# Patient Record
Sex: MALE | Race: WHITE | HISPANIC OR LATINO | URBAN - METROPOLITAN AREA
[De-identification: names, ages, dates, MRNs, and addresses within clinical notes are randomized per-mention and may not be internally consistent; named-entity substitution may affect disease eponyms.]

---

## 2018-10-08 ENCOUNTER — EMERGENCY (EMERGENCY)
Facility: HOSPITAL | Age: 33
LOS: 1 days | Discharge: HOME | End: 2018-10-08
Attending: EMERGENCY MEDICINE | Admitting: EMERGENCY MEDICINE
Payer: MEDICAID

## 2018-10-08 VITALS
HEART RATE: 71 BPM | DIASTOLIC BLOOD PRESSURE: 78 MMHG | SYSTOLIC BLOOD PRESSURE: 125 MMHG | RESPIRATION RATE: 18 BRPM | OXYGEN SATURATION: 100 % | TEMPERATURE: 98 F

## 2018-10-08 DIAGNOSIS — Z59.0 HOMELESSNESS: ICD-10-CM

## 2018-10-08 DIAGNOSIS — M79.669 PAIN IN UNSPECIFIED LOWER LEG: ICD-10-CM

## 2018-10-08 PROCEDURE — 99053 MED SERV 10PM-8AM 24 HR FAC: CPT

## 2018-10-08 PROCEDURE — 99282 EMERGENCY DEPT VISIT SF MDM: CPT | Mod: 25

## 2018-10-08 SDOH — ECONOMIC STABILITY - HOUSING INSECURITY: HOMELESSNESS: Z59.0

## 2018-10-08 NOTE — ED ADULT NURSE NOTE - OBJECTIVE STATEMENT
Pt is a 33y male complaining of bilateral leg pain. Pt is a 33y male complaining of bilateral leg pain. Pt denies injury. Pt ambulates with steady gait. Pt denies chest pain, sob, nausea, vomiting, fever, chills.

## 2018-10-08 NOTE — ED PROVIDER NOTE - MEDICAL DECISION MAKING DETAILS
pt reports he just needs place to rest, no medical complaints, homeless    will allow to rest until am, no acute medical issues

## 2018-10-08 NOTE — ED PROVIDER NOTE - PROGRESS NOTE DETAILS
Patient AAOx3, clear speech, steady gait, appropriate for discharge, patient feels improved, wants to go home, tolerating PO, will discharge with strict return precautions, follow up with PMD

## 2018-10-08 NOTE — ED ADULT NURSE NOTE - MUSCULOSKELETAL ASSESSMENT
Chief Complaint   Patient presents with   • Results     Labs   • Medication Refill     Omeprazole-Insurance won't cover       HISTORY OF PRESENT ILLNESS: Patient is a 35 y.o. male established patient who presents today to discuss number of issues    Dyspepsia  Patient states history of irregular bowel habits and history of onset stomach. Positive history of nausea. Patient states that ranitidine 150 mg prescription strength worked better than the over-the-counter remedy patient states that also the omeprazole did help, but states only 5 days at a time was being prescribed for patient and filled through his insurance. Patient she would like to try a different medication.    Irregular bowel habits  Chronic in nature. Patient states still symptomatic. Patient states he has not followed up with GI from previous encounter.    Patient Active Problem List    Diagnosis Date Noted   • Tourette disorder 07/21/2016   • Irregular bowel habits 07/21/2016   • Dyspepsia 07/21/2016     Allergies:Review of patient's allergies indicates no known allergies.    Current Outpatient Prescriptions   Medication Sig Dispense Refill   • rabeprazole (ACIPHEX) 20 MG tablet Take 1 Tab by mouth every day. 30 Tab 3   • potassium chloride ER (KLOR-CON) 10 MEQ tablet Take 1 Tab by mouth every day for 5 days. 5 Tab 0   • levothyroxine (SYNTHROID) 25 MCG Tab Take 1 Tab by mouth Every morning on an empty stomach. 30 Tab 2   • witch hazel-glycerin (SOOZE) Pads i pad applied bid 60 Each 0   • ondansetron (ZOFRAN) 4 MG Tab tablet Take 1 Tab by mouth every four hours as needed for Nausea/Vomiting. 30 Tab 1   • clonidine (CATAPRESS) 0.3 MG Tab Take 1 Tab by mouth 3 times a day and at bedtime. 90 Tab 3     No current facility-administered medications for this visit.     Social History   Substance Use Topics   • Smoking status: Never Smoker    • Smokeless tobacco: Never Used   • Alcohol Use: No     No family status information on file.   No family history on  "file.    Review of Systems:   Constitutional: Negative for fever, chills, weight loss and malaise/fatigue.   HENT: Negative for ear pain, nosebleeds, congestion, sore throat and neck pain.    Eyes: Negative for blurred vision.   Respiratory: Negative for cough, sputum production, shortness of breath and wheezing.    Cardiovascular: Negative for chest pain, palpitations, orthopnea and leg swelling.   Gastrointestinal: Negative for heartburn, nausea, vomiting and abdominal pain.   Genitourinary: Negative for dysuria, urgency and frequency.   Musculoskeletal: Negative for myalgias, back pain and joint pain.   Skin: Negative for rash and itching.   Neurological: Negative for dizziness, tingling, tremors, sensory change, focal weakness and headaches.   Endo/Heme/Allergies: Does not bruise/bleed easily.   Psychiatric/Behavioral: Negative for depression, suicidal ideas and memory loss.  The patient is not nervous/anxious and does not have insomnia.    All other systems reviewed and are negative except as in HPI.    Exam:  Blood pressure 118/70, pulse 74, temperature 36.7 °C (98.1 °F), resp. rate 16, height 1.778 m (5' 10\"), weight 79.833 kg (176 lb), SpO2 100 %.  General:  Well nourished, well developed male in NAD  Head: is grossly normal.  Neck: Supple without JVD or bruit. Thyroid is not enlarged.  Pulmonary: Clear to ausculation. Normal effort. No rales, ronchi, or wheezing.  Cardiovascular: Regular rate and rhythm without murmur. Carotid and radial pulses are intact and equal bilaterally.  Extremities: no clubbing, cyanosis, or edema.    Medical decision-making and discussion: I have reviewed labs with patient which does show slight hypokalemia and therefore five-day prescription for KCl has been written 10 mEq. Also noted positive hypothyroidism with elevated TSH and in discussion with patient I would like to start 25 µg daily basis with reevaluation in 30 days. I have expressed with patient the importance of " following up with gastroenterology.    Please note that this dictation was created using voice recognition software. I have made every reasonable attempt to correct obvious errors, but I expect that there are errors of grammar and possibly content that I did not discover before finalizing the note.    Assessment/Plan:  1. Dyspepsia  rabeprazole (ACIPHEX) 20 MG tablet   2. Irregular bowel habits  rabeprazole (ACIPHEX) 20 MG tablet   3. Hypokalemia  potassium chloride ER (KLOR-CON) 10 MEQ tablet   4. Hypothyroidism, unspecified type  levothyroxine (SYNTHROID) 25 MCG Tab    TSH+FREE T4             WDL

## 2018-10-08 NOTE — ED ADULT NURSE NOTE - CHPI ED NUR SYMPTOMS NEG
no vomiting/no decreased eating/drinking/no weakness/no chills/no tingling/no fever/no nausea/no dizziness

## 2019-07-29 NOTE — ED ADULT NURSE NOTE - CAS TRG GEN SKIN CONDITION
Medication(s) Requested: phentermine (ADIPEX-P) 37.5 MG tablet  Last office visit: 5/28/2019  Last refill: 6/27/2019  Is the patient due for refill of this medication(s): Yes  PDMP review: Criteria met. Forwarded to Physician/JANNA for signature.      Warm/Dry
